# Patient Record
Sex: MALE | Race: WHITE | NOT HISPANIC OR LATINO | URBAN - METROPOLITAN AREA
[De-identification: names, ages, dates, MRNs, and addresses within clinical notes are randomized per-mention and may not be internally consistent; named-entity substitution may affect disease eponyms.]

---

## 2024-04-16 PROBLEM — I10 HTN (HYPERTENSION): Status: ACTIVE | Noted: 2024-04-16

## 2024-04-16 PROBLEM — R06.02 SHORTNESS OF BREATH: Status: ACTIVE | Noted: 2024-04-16

## 2024-04-16 PROBLEM — R60.0 PEDAL EDEMA: Status: ACTIVE | Noted: 2024-04-16

## 2024-04-16 PROBLEM — E78.5 HYPERLIPIDEMIA: Status: ACTIVE | Noted: 2024-04-16

## 2024-04-16 PROBLEM — I50.32 CHRONIC DIASTOLIC CONGESTIVE HEART FAILURE (MULTI): Status: ACTIVE | Noted: 2024-04-16

## 2024-04-16 PROBLEM — I35.0 MODERATE AORTIC STENOSIS: Status: ACTIVE | Noted: 2024-04-16

## 2024-04-16 PROBLEM — I25.10 CORONARY ARTERY DISEASE: Status: ACTIVE | Noted: 2024-04-16

## 2024-04-26 ENCOUNTER — TELEPHONE (OUTPATIENT)
Dept: CARDIOLOGY | Facility: CLINIC | Age: 84
End: 2024-04-26
Payer: MEDICARE

## 2024-05-03 ENCOUNTER — APPOINTMENT (OUTPATIENT)
Dept: CARDIOLOGY | Facility: CLINIC | Age: 84
End: 2024-05-03
Payer: MEDICARE

## 2024-05-03 NOTE — TELEPHONE ENCOUNTER
Eri called to report ov with Dr. Starr had to be cancelled on 5/3/24 because pt is having frequent falls and declining health status.    Per Eri, pt currently resides at [facility] in [place]. They are unable to bring pt to office at this time for visits because he keeps falling and is very weak.     Eri is not sure how to proceed with visits with Dr. Starr who manages his BP medications.    Informed Eri we could arrange for a virtual visit so Dr. Starr can review pt's medications, BP's and labs which [facility] would need to fax to our office.    Eri will speak with nurses at [facility] and call me back with an update.    Ok to see pt virtually for now until he can leave facility?    Please advise. Thanks.   
Eri returned call. I discussed plan with her for [facility] to fax the following information to our office: Medication list, BP's, labs. Informed Eri once this information is received, Dr. Starr will review and we can arrange a telephone or virtual visit. Provided Eri with our fax number to give to [facility]. Eri verbalizes understanding of all instructions and information provided.   
LM to call office.    
Schedule virtual or telephone visit after you receive information from [facility] Arredondo.  Can do virtual visits in the future.
Alert and oriented to person, place and time

## 2024-07-03 ENCOUNTER — APPOINTMENT (OUTPATIENT)
Dept: CARDIOLOGY | Facility: CLINIC | Age: 84
End: 2024-07-03
Payer: MEDICARE